# Patient Record
Sex: FEMALE | Race: BLACK OR AFRICAN AMERICAN
[De-identification: names, ages, dates, MRNs, and addresses within clinical notes are randomized per-mention and may not be internally consistent; named-entity substitution may affect disease eponyms.]

---

## 2019-06-19 ENCOUNTER — HOSPITAL ENCOUNTER (EMERGENCY)
Dept: HOSPITAL 12 - ER | Age: 29
Discharge: HOME | End: 2019-06-19
Payer: COMMERCIAL

## 2019-06-19 VITALS — BODY MASS INDEX: 42.68 KG/M2 | HEIGHT: 64 IN | WEIGHT: 250 LBS

## 2019-06-19 DIAGNOSIS — M25.572: Primary | ICD-10-CM
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
## 2019-06-19 NOTE — NUR
SPLINT APPLID BY REINA MARTINEZ LVN.  CRUTCHES WITH DEMO GIVEN AND COMPLETED.    DC 
AND F/U INSTRUCTIONS GIVEN AND EXPLAINED TO PATIENT WHO STATES SHE UNDERSTANDS 
ALL INSTRUCTIONS.